# Patient Record
Sex: FEMALE | Race: WHITE
[De-identification: names, ages, dates, MRNs, and addresses within clinical notes are randomized per-mention and may not be internally consistent; named-entity substitution may affect disease eponyms.]

---

## 2017-09-19 ENCOUNTER — HOSPITAL ENCOUNTER (OUTPATIENT)
Dept: HOSPITAL 75 - RAD | Age: 67
End: 2017-09-19
Attending: FAMILY MEDICINE
Payer: MEDICARE

## 2017-09-19 DIAGNOSIS — R09.89: Primary | ICD-10-CM

## 2017-09-19 PROCEDURE — 76775 US EXAM ABDO BACK WALL LIM: CPT

## 2017-09-19 NOTE — DIAGNOSTIC IMAGING REPORT
EXAMINATION:

Ultrasound of the aorta.



INDICATION: 

Bounding abdominal aorta.



FINDINGS:

The proximal abdominal aorta is 1.9, at the mid level 1.6, and

distally 1.5 cm in caliber. The right common iliac artery is up

to 0.8 and the left is 0.8 cm in caliber. No aneurysm. Color

Doppler demonstrates patency of these vessels.



IMPRESSION: 

Normal caliber of the abdominal aorta and common iliac arteries.



Dictated by: 



  Dictated on workstation # [id number]

## 2018-06-11 ENCOUNTER — HOSPITAL ENCOUNTER (OUTPATIENT)
Dept: HOSPITAL 75 - PREOP | Age: 68
End: 2018-06-11
Attending: SURGERY
Payer: MEDICARE

## 2018-06-11 VITALS — WEIGHT: 127 LBS | HEIGHT: 62 IN | BODY MASS INDEX: 23.37 KG/M2

## 2018-06-11 DIAGNOSIS — Z12.11: ICD-10-CM

## 2018-06-11 DIAGNOSIS — Z01.818: Primary | ICD-10-CM

## 2018-06-13 ENCOUNTER — HOSPITAL ENCOUNTER (OUTPATIENT)
Dept: HOSPITAL 75 - ENDO | Age: 68
Discharge: HOME | End: 2018-06-13
Attending: SURGERY
Payer: MEDICARE

## 2018-06-13 VITALS — SYSTOLIC BLOOD PRESSURE: 121 MMHG | DIASTOLIC BLOOD PRESSURE: 62 MMHG

## 2018-06-13 VITALS — DIASTOLIC BLOOD PRESSURE: 68 MMHG | SYSTOLIC BLOOD PRESSURE: 126 MMHG

## 2018-06-13 VITALS — HEIGHT: 62 IN | WEIGHT: 127 LBS | BODY MASS INDEX: 23.37 KG/M2

## 2018-06-13 VITALS — DIASTOLIC BLOOD PRESSURE: 73 MMHG | SYSTOLIC BLOOD PRESSURE: 134 MMHG

## 2018-06-13 DIAGNOSIS — K57.30: ICD-10-CM

## 2018-06-13 DIAGNOSIS — Z87.891: ICD-10-CM

## 2018-06-13 DIAGNOSIS — Z12.11: Primary | ICD-10-CM

## 2018-06-13 DIAGNOSIS — Z86.73: ICD-10-CM

## 2018-06-13 RX ADMIN — SODIUM CHLORIDE PRN MLS/HR: 900 INJECTION, SOLUTION INTRAVENOUS at 12:30

## 2018-06-13 RX ADMIN — FENTANYL CITRATE PRN MCG: 50 INJECTION, SOLUTION INTRAMUSCULAR; INTRAVENOUS at 12:30

## 2018-06-13 RX ADMIN — MIDAZOLAM HYDROCHLORIDE PRN MG: 1 INJECTION, SOLUTION INTRAMUSCULAR; INTRAVENOUS at 12:10

## 2018-06-13 RX ADMIN — MIDAZOLAM HYDROCHLORIDE PRN MG: 1 INJECTION, SOLUTION INTRAMUSCULAR; INTRAVENOUS at 12:35

## 2018-06-13 RX ADMIN — FENTANYL CITRATE PRN MCG: 50 INJECTION, SOLUTION INTRAMUSCULAR; INTRAVENOUS at 12:05

## 2018-06-13 RX ADMIN — FENTANYL CITRATE PRN MCG: 50 INJECTION, SOLUTION INTRAMUSCULAR; INTRAVENOUS at 11:40

## 2018-06-13 RX ADMIN — MIDAZOLAM HYDROCHLORIDE PRN MG: 1 INJECTION, SOLUTION INTRAMUSCULAR; INTRAVENOUS at 11:45

## 2018-06-13 RX ADMIN — MIDAZOLAM HYDROCHLORIDE PRN MG: 1 INJECTION, SOLUTION INTRAMUSCULAR; INTRAVENOUS at 12:15

## 2018-06-13 RX ADMIN — FENTANYL CITRATE PRN MCG: 50 INJECTION, SOLUTION INTRAMUSCULAR; INTRAVENOUS at 12:20

## 2018-06-13 RX ADMIN — MIDAZOLAM HYDROCHLORIDE PRN MG: 1 INJECTION, SOLUTION INTRAMUSCULAR; INTRAVENOUS at 12:25

## 2018-06-13 RX ADMIN — SODIUM CHLORIDE PRN MLS/HR: 900 INJECTION, SOLUTION INTRAVENOUS at 10:40

## 2018-06-13 NOTE — CONSCIOUS SEDATION/ASA
Conscious Sedation Pre-Proced


Time Reviewed:  10:45


ASA Class:  2











Airway Mallampati Classification: ([place] appropriate class) I.  II.  III,  IV


 


Lungs 


 


Heart 


 


 ASA score


 


 ASA 1: a normal healthy patient


 


 ASA 2:  a patient with a mild systemic disease (mid diabetes, controlled 

hypertension, obesity 


 


 ASA 3:  a patient with a severe systemic disease that limits activity  (angina

, COPD, prior Myocardial infarction)


 


 ASA 4:  a patient with an incapacitating disease that is a constant threat to 

life (CHF, renal failure)


 


 ASA 5:  a moribund patient not expected to survive 24 hrs.  (ruptured aneurysm)


 


 ASA 6:  a declared brain dead patient whose organs are being harvested.


 


 For emergent operations, add the letter E after the classification








Grade 2


Sedation Plan:  Analgesia, Amnesia, Plan communicated to team members, 

Discussed options with patient/fam, Discussed risks with patient/fam


Note


The patient is an appropriate candidate to undergo the planned procedure, 

sedation, and anesthesia.





The patient immediately re-assessed prior to indication.











BERNIE PATTON MD Jun 13, 2018 10:53

## 2018-06-13 NOTE — PROGRESS NOTE-PRE OPERATIVE
Pre-Operative Progress Note


H&P Reviewed


The H&P was reviewed, patient examined and no changes noted.


Date Seen by Provider:  Jun 13, 2018


Time Seen by Provider:  10:45


Date H&P Reviewed:  Jun 13, 2018


Time H&P Reviewed:  10:45


Pre-Operative Diagnosis:  screening colonoscopy











BERNIE PATTON MD Jun 13, 2018 10:54

## 2018-06-13 NOTE — XMS REPORT
Continuity of Care Document

 Created on: 2018



FARIBA ENGEL

External Reference #: [id number]

: 1950

Sex: Female



Demographics







 Address  [address] [place]  [address]

 

 Home Phone  [phone] x

 

 Preferred Language  Unknown

 

 Marital Status  Unknown

 

 Restoration Affiliation  Unknown

 

 Race  Unknown

 

 Ethnic Group  Unknown





Author







 Author  [facility]

 

 Organization  [facility]

 

 Address  Unknown

 

 Phone  Unavailable



              



Allergies

      





 Active            Description            Code            Type            
Severity            Reaction            Onset            Reported/Identified   
         Relationship to Patient            Clinical Status        

 

 Yes            NKANo Known Allergies            NKA            Miscellaneous 
Allergy            Unknown            N/A                         10/03/2007   
                               



                  



Medications

      



There is no data.                  



Problems

      





 Date Dx Coded            Attending            Type            Code            
Diagnosis            Diagnosed By        

 

 2017            DIANDRA BROWNE MD            Ot            V49.81    
        ASYMPT POSTMENOPAUSAL STATUS (AGE-RELATE                     

 

 2017            DIANDRA BROWNE MD            Ot            V82.81    
        SCREENING FOR OSTEOPOROSIS                     

 

 2017            JONATHAN SOLANO MD            Ot            435.9       
     TRANS CEREB ISCHEMIA NOS                     

 

 2017            JONATHAN SOLANO MD            Ot            786.50      
      CHEST PAIN NOS                     

 

 2017            DIANDRA BROWNE MD            Ot            R09.89    
        OTH SYMPTOMS AND SIGNS INVOLVING THE CIR                     

 

 2017            DIANDRA BROWNE MD            Ot            R09.89    
        OTH SYMPTOMS AND SIGNS INVOLVING THE CIR                     

 

 2017            DIANDRA BROWNE MD            Ot            R09.89    
        OTH SYMPTOMS AND SIGNS INVOLVING THE CIR                     

 

 2017            DIANDRA BROWNE MD            Ot            V49.81    
        ASYMPT POSTMENOPAUSAL STATUS (AGE-RELATE                     

 

 2017            DIANDRA BROWNE MD            Ot            V82.81    
        SCREENING FOR OSTEOPOROSIS                     

 

 2017            JONATHAN SOLANO MD            Ot            435.9       
     TRANS CEREB ISCHEMIA NOS                     

 

 2017            JONATHAN SOLANO MD            Ot            786.50      
      CHEST PAIN NOS                     

 

 2017            DIANDRA BROWNE MD            Ot            R09.89    
        OTH SYMPTOMS AND SIGNS INVOLVING THE CIR                     

 

 2017            DIANDRA BROWNE MD            Ot            R09.89    
        OTH SYMPTOMS AND SIGNS INVOLVING THE CIR                     

 

 2017            DIANDRA BROWNE MD            Ot            R09.89    
        OTH SYMPTOMS AND SIGNS INVOLVING THE CIR                     

 

 10/12/2017            DIANDRA BROWNE MD            Ot            R09.89    
        OTH SYMPTOMS AND SIGNS INVOLVING THE CIR                     

 

 10/18/2017            DIANDRA BROWNE MD            Ot            R09.89    
        OTH SYMPTOMS AND SIGNS INVOLVING THE CIR                     



                                                



Procedures

      



There is no data.                  



Results

      



There is no data.              



Encounters

      





 ACCT No.            Visit Date/Time            Discharge            Status    
        Pt. Type            Provider            Facility            Loc./Unit  
          Complaint        

 

 [id number]            2017 08:00:00            2017 23:59:59    
        CLS            Outpatient            DIANDRA BROWNE MD            
[facility]            RAD            BOUNDING ABD AORTA  
      

 

 [id number]            2014 10:57:00            2014 23:59:59    
        CLS            Outpatient            XIOMARA REAL, JONATHAN FREEMAN            [facility]            CARD            CP TIA        

 

 [id number]            2014 09:35:00            2014 23:59:59    
        CLS            Outpatient            DIANDRA BROWNE MD            
[facility]            RAD            INITIAL PREVENTIVE 
EXAM        

 

 [id number]            2017 18:49:30            2017 23:59:59            
CLS            Outpatient

## 2018-06-13 NOTE — PROGRESS NOTE-POST OPERATIVE
Post-Operative Progess Note


Surgeon (s)/Assistant (s)


Surgeon


BERNIE PATTON MD


Assistant:  none





Pre-Operative Diagnosis


screening colonoscopy





Post-Operative Diagnosis





mild-mod sigmoid diverticulosis.





Procedure & Operative Findings


Date of Procedure


6/13/18


Procedure Performed/Findings


Colonoscopy


Anesthesia Type


CS





Estimated Blood Loss


Estimated blood loss (mL):  minimal





Specimens/Packing


Specimens Removed


none











BERNIE PATTON MD Jun 13, 2018 12:57 pm

## 2018-06-13 NOTE — OPERATIVE REPORT
DATE OF SERVICE:  06/13/2018



ATTENDING PHYSICIAN:

Dr. Telma Gregory.



ATTENDING PRIMARY CARE PHYSICIAN"

Telma Gregory MD



PREOPERATIVE DIAGNOSIS:

Screening colonoscopy.



POSTOPERATIVE DIAGNOSIS:

Mild to moderate sigmoid diverticulosis.



PROCEDURE:

Colonoscopy.



SURGEON:

Bernie Patton.



ANESTHESIA:

Conscious sedation.



ESTIMATED BLOOD LOSS:

Minimal.



FINDINGS:

Mild to moderate sigmoid diverticulosis, no mucosal inflammatory change to

indicate any active diverticulitis.  Remainder of the colon was normal.  There

were no polyps identified.



DISPOSITION:

The patient tolerated the procedure well.



INDICATIONS:

The patient is a 67-year-old female in need of a followup screening colonoscopy.

 Her last colonoscopy was approximately 10 years ago and she believes that to be

normal.  She states for the most part she is doing well and does not report any

major issues of diarrhea nor constipation as well as no red blood per rectum nor

any dark tarry stools.  She also does not report any family history of colon

cancer.



DESCRIPTION OF PROCEDURE:

The patient was brought to the endoscopy suite, laid in the left lateral

decubitus position.  After adequate IV pain and sedating medications and

conscious sedation anesthesia, a digital rectal examination was performed. No

significant hemorrhoids identified.  Normal sphincter tone was felt and there

were no palpable masses.



The endoscope was then intubated to the anus and rectum gently insufflated.  The

endoscope was then advanced to the valves of Wilson rectum with no polyps or

any neoplasms identified.  We then proceeded to the sigmoid colon where mid to

moderate sized sigmoid diverticulosis was identified with no mucosal

inflammatory change to indicate any active diverticulitis.  The endoscope was

then advanced to the remainder of the descending, transverse and ascending colon

to the cecum.  These segments were normal.  There were no polyps or any

neoplasms identified throughout the colon or rectum.  The endoscope was then

slowly withdrawn while taking a second look and suctioning of residual air with

no additional findings.



The patient tolerated the procedure well.  We will have her continue with

medical management with a high fiber diet with at least 25 grams of fiber per

day as well as at least 64 fluid ounces of water daily to promote soft stools on

a daily basis.  She does not need another colonoscopy for another 10 years;

however, sooner if she becomes symptomatic.





Job ID: [id number]

DocumentID: [id number]

Dictated Date:  06/13/2018 12:57:25

Transcription Date: 06/13/2018 20:12:07

Dictated By: BERNIE PATTON MD

## 2018-06-13 NOTE — DISCHARGE INST-SURGICAL
D/C Lap Instructions-POOJA


Follow Up 10 years





Activity as tolerated





High Fiber Diet 25g or more per day





Avoid Alcohol, Caffeine, Spicy Greasy and Acid foods.





Drink 64 fluid oz or more of fluids per day.





Symptoms to Report: Fever over 101 degree F, Nausea/Vomiting 


If any problems/questions: Contact your physician or go to Emergency Room











BERNIE PATTON MD Jun 13, 2018 12:58 pm

## 2020-10-03 ENCOUNTER — HOSPITAL ENCOUNTER (OUTPATIENT)
Dept: HOSPITAL 75 - LAB | Age: 70
End: 2020-10-03
Attending: NURSE PRACTITIONER
Payer: MEDICARE

## 2020-10-03 DIAGNOSIS — R42: ICD-10-CM

## 2020-10-03 DIAGNOSIS — R11.0: ICD-10-CM

## 2020-10-03 DIAGNOSIS — I10: Primary | ICD-10-CM

## 2020-10-03 LAB
ALBUMIN SERPL-MCNC: 4.8 GM/DL (ref 3.2–4.5)
ALP SERPL-CCNC: 93 U/L (ref 40–136)
ALT SERPL-CCNC: 18 U/L (ref 0–55)
BASOPHILS # BLD AUTO: 0.1 10^3/UL (ref 0–0.1)
BASOPHILS NFR BLD AUTO: 1 % (ref 0–10)
BILIRUB SERPL-MCNC: 0.5 MG/DL (ref 0.1–1)
BUN/CREAT SERPL: 21
CALCIUM SERPL-MCNC: 9.9 MG/DL (ref 8.5–10.1)
CHLORIDE SERPL-SCNC: 105 MMOL/L (ref 98–107)
CK MB SERPL-MCNC: 1 NG/ML (ref ?–6.6)
CO2 SERPL-SCNC: 23 MMOL/L (ref 21–32)
CREAT SERPL-MCNC: 0.96 MG/DL (ref 0.6–1.3)
EOSINOPHIL # BLD AUTO: 0 10^3/UL (ref 0–0.3)
EOSINOPHIL NFR BLD AUTO: 0 % (ref 0–10)
GFR SERPLBLD BASED ON 1.73 SQ M-ARVRAT: 57 ML/MIN
GLUCOSE SERPL-MCNC: 113 MG/DL (ref 70–105)
HCT VFR BLD CALC: 44 % (ref 35–52)
HGB BLD-MCNC: 13.8 G/DL (ref 11.5–16)
LYMPHOCYTES # BLD AUTO: 1.5 10^3/UL (ref 1–4)
LYMPHOCYTES NFR BLD AUTO: 16 % (ref 12–44)
MANUAL DIFFERENTIAL PERFORMED BLD QL: NO
MCH RBC QN AUTO: 30 PG (ref 25–34)
MCHC RBC AUTO-ENTMCNC: 32 G/DL (ref 32–36)
MCV RBC AUTO: 95 FL (ref 80–99)
MONOCYTES # BLD AUTO: 0.4 10^3/UL (ref 0–1)
MONOCYTES NFR BLD AUTO: 5 % (ref 0–12)
NEUTROPHILS # BLD AUTO: 7.3 10^3/UL (ref 1.8–7.8)
NEUTROPHILS NFR BLD AUTO: 78 % (ref 42–75)
PLATELET # BLD: 183 10^3/UL (ref 130–400)
PMV BLD AUTO: 11.1 FL (ref 9–12.2)
POTASSIUM SERPL-SCNC: 4.1 MMOL/L (ref 3.6–5)
PROT SERPL-MCNC: 8 GM/DL (ref 6.4–8.2)
SODIUM SERPL-SCNC: 141 MMOL/L (ref 135–145)
WBC # BLD AUTO: 9.3 10^3/UL (ref 4.3–11)

## 2020-10-03 PROCEDURE — 82553 CREATINE MB FRACTION: CPT

## 2020-10-03 PROCEDURE — 36415 COLL VENOUS BLD VENIPUNCTURE: CPT

## 2020-10-03 PROCEDURE — 84484 ASSAY OF TROPONIN QUANT: CPT

## 2020-10-03 PROCEDURE — 80053 COMPREHEN METABOLIC PANEL: CPT

## 2020-10-03 PROCEDURE — 85025 COMPLETE CBC W/AUTO DIFF WBC: CPT

## 2020-12-09 ENCOUNTER — HOSPITAL ENCOUNTER (OUTPATIENT)
Dept: HOSPITAL 75 - CARD | Age: 70
End: 2020-12-09
Attending: INTERNAL MEDICINE
Payer: MEDICARE

## 2020-12-09 DIAGNOSIS — I08.0: ICD-10-CM

## 2020-12-09 DIAGNOSIS — I63.9: Primary | ICD-10-CM

## 2020-12-09 PROCEDURE — 93306 TTE W/DOPPLER COMPLETE: CPT

## 2020-12-16 ENCOUNTER — HOSPITAL ENCOUNTER (OUTPATIENT)
Dept: HOSPITAL 75 - LABNPT | Age: 70
End: 2020-12-16
Attending: FAMILY MEDICINE
Payer: MEDICARE

## 2020-12-16 DIAGNOSIS — Z53.9: Primary | ICD-10-CM

## 2020-12-17 ENCOUNTER — HOSPITAL ENCOUNTER (OUTPATIENT)
Dept: HOSPITAL 75 - LABNPT | Age: 70
End: 2020-12-17
Attending: FAMILY MEDICINE
Payer: MEDICARE

## 2020-12-17 DIAGNOSIS — Z20.828: Primary | ICD-10-CM

## 2020-12-17 PROCEDURE — 87635 SARS-COV-2 COVID-19 AMP PRB: CPT

## 2021-06-08 ENCOUNTER — HOSPITAL ENCOUNTER (OUTPATIENT)
Dept: HOSPITAL 75 - RAD | Age: 71
End: 2021-06-08
Attending: FAMILY MEDICINE
Payer: MEDICARE

## 2021-06-08 DIAGNOSIS — Z78.0: ICD-10-CM

## 2021-06-08 DIAGNOSIS — M85.80: Primary | ICD-10-CM

## 2021-06-08 PROCEDURE — 77080 DXA BONE DENSITY AXIAL: CPT

## 2021-06-08 NOTE — DIAGNOSTIC IMAGING REPORT
INDICATION: Postmenopausal state.



COMPARISON: 02/20/2014



FINDINGS:



AP Spine L1-L4:  

[BMD (g/cm2): 1.178] [T-Score: -0.2] [Z-Score: 1.8]

[BMD Previous: 1.127] [BMD % Change: 4.5]



LT Hip Neck:       

[BMD (g/cm2): 0.797] [T-Score: -1.7] [Z-Score: 0.3]



LT Hip Total:       

[BMD (g/cm2):0.896] [T-Score:-0.9] [Z-Score: 0.9]

[BMD Previous: 0.908] [BMD % Change: -1.3]



RT Hip Neck:      

[BMD (g/cm2):0.784] [T-Score:-1.8] [Z-Score:0.2]



RT Hip Total:      

[BMD (g/cm2):0.801] [T-score:-1.6] [Z-Score:0.2]

[BMD Previous:0.862] [BMD % Change:-7.1]



*Indicates significant change from prior examination based on 95%

confidence level.



World Health Organization criteria for BMD interpretation

classify patients as Normal (T-score at or above -1.0),

Osteopenic (T-score between -1.0 and -2.5) or Osteoporotic

(T-score at or below -2.5).



LIMITATIONS AND MODIFICATION:  None.



FRACTURE RISK (FRAX SCORE):

The ten year probability of (%): 

Major Osteoporotic Fracture: [11.3]

Hip Fracture: [2.4]



IMPRESSION:

1. Osteopenia (Low bone mass).

2. No significant change in bone mineral density since prior

examination. 

3. See below National Osteoporosis Foundation guidelines on when

to potentially initiate pharmacologic therapy. 



Based on the National Osteoporosis Foundation Guidelines,

pharmacologic treatment should be initiated in any of the

following, unless clinical conditions suggest otherwise:



*  Any patient with prior fragility fracture of the hip or

vertebrae. A spine fracture indicates 5X risk for subsequent

spine fracture and 2X risk for subsequent hip fracture.



*  Osteoporosis (T-score <-2.5).



*  Postmenopausal women and men age 50 and older with low bone

mass/osteopenia (T-score between -1.0 and -2.5) by DXA and

10-year major osteoporotic fracture greater than 20% or a 10-year

probability of hip fracture greater than 3%. These fracture risks

are supplied above in the FRAX score, if applicable.



*  Clinician judgement and/or patient preferences may indicate

treatment for people with 10-year fracture probabilities above or

below these levels.



Dictated by: 



  Dictated on workstation # [id number]

## 2023-03-22 ENCOUNTER — HOSPITAL ENCOUNTER (OUTPATIENT)
Dept: HOSPITAL 75 - CARD | Age: 73
End: 2023-03-22
Attending: PHYSICIAN ASSISTANT
Payer: MEDICARE

## 2023-03-22 VITALS — SYSTOLIC BLOOD PRESSURE: 131 MMHG | DIASTOLIC BLOOD PRESSURE: 70 MMHG

## 2023-03-22 DIAGNOSIS — I10: Primary | ICD-10-CM

## 2023-03-22 PROCEDURE — 78452 HT MUSCLE IMAGE SPECT MULT: CPT

## 2023-03-22 PROCEDURE — 93017 CV STRESS TEST TRACING ONLY: CPT

## 2023-03-22 NOTE — CARDIOLOGY STRESS TEST REPORT
Stress Test Report


Date of Procedure/Referring:


Date of Procedure:  Mar 22, 2023


PCP


Diandra Gregory MD


Admitting Physician


Admitting Physician:


 








Attending Physician:


Lata Reeder





Indications:


HTN





Baseline Heart Rate:


67





Baseline Blood Pressure:


Blood Pressure Systolic:  131


Blood Pressure Diastolic:  70





Vital Signs








  Date Time  Temp Pulse Resp B/P (MAP) Pulse Ox O2 Delivery O2 Flow Rate FiO2


 


3/22/23 09:00  67  131/70 (90)    








Baseline Vital Signs





Vital Signs








  Date Time  Temp Pulse Resp B/P (MAP) Pulse Ox O2 Delivery O2 Flow Rate FiO2


 


3/22/23 09:00  67  131/70 (90)    











Baseline EKG:


Baseline EKG:  NSR





Summary:


After explaining the procedure and details to the patient, she  signed the 

consent and was brought to the stress nuclear laboratory.





Patient exercised on standard Chauncey protocol, EKG, heart rate and blood pressure

were monitored continuously, resting and stress doses of radio tracer were 

injected, imaging was acquired and reviewed in the short axis, horizontal long 

axis and vertical long axis views





Patient was able to exercise for a total of 7 minutes on Chauncey protocol,  METs 

8.5


Maximum heart rate 135      


Maximum blood pressure 155/59       


Stress EKG, Minimal nondiagnostic changes


Recovery EKG, Return to baseline


TID:  1.08


SSS:  4


SDS:  2


EF:  71





Conclusion:


Good exercise tolerance for a total of 7 minutes on standard Chauncey protocol, 8.5

METS achieving 100% of maximal expected heart rate


Appropriate heart rate and blood pressure response to exercise response to 

exercise return to baseline during recovery


Nondiagnostic EKG changes with exercise return to baseline during recovery


Typical female pattern with no significant ischemia or infarction noted on SPECT

images


Normal left ventricular size, ejection fraction 71%





Copy


Copies To 1:   DIANDRA GREGORY MD, BASHAR J MD              Mar 22, 2023 13:45